# Patient Record
Sex: MALE | Race: WHITE | NOT HISPANIC OR LATINO | Employment: PART TIME | URBAN - METROPOLITAN AREA
[De-identification: names, ages, dates, MRNs, and addresses within clinical notes are randomized per-mention and may not be internally consistent; named-entity substitution may affect disease eponyms.]

---

## 2024-07-01 ENCOUNTER — HOSPITAL ENCOUNTER (EMERGENCY)
Facility: HOSPITAL | Age: 24
Discharge: HOME | End: 2024-07-01
Payer: MEDICAID

## 2024-07-01 VITALS
OXYGEN SATURATION: 98 % | RESPIRATION RATE: 16 BRPM | HEIGHT: 75 IN | HEART RATE: 60 BPM | SYSTOLIC BLOOD PRESSURE: 115 MMHG | DIASTOLIC BLOOD PRESSURE: 57 MMHG | WEIGHT: 155 LBS | TEMPERATURE: 96.8 F | BODY MASS INDEX: 19.27 KG/M2

## 2024-07-01 DIAGNOSIS — K04.7 DENTAL ABSCESS: Primary | ICD-10-CM

## 2024-07-01 PROCEDURE — 99283 EMERGENCY DEPT VISIT LOW MDM: CPT | Mod: 25

## 2024-07-01 PROCEDURE — 41800 DRAINAGE OF GUM LESION: CPT

## 2024-07-01 PROCEDURE — 40800 DRAINAGE OF MOUTH LESION: CPT | Performed by: NURSE PRACTITIONER

## 2024-07-01 RX ORDER — AMOXICILLIN AND CLAVULANATE POTASSIUM 875; 125 MG/1; MG/1
1 TABLET, FILM COATED ORAL EVERY 12 HOURS
Qty: 14 TABLET | Refills: 0 | Status: SHIPPED | OUTPATIENT
Start: 2024-07-01 | End: 2024-07-08

## 2024-07-01 RX ORDER — NAPROXEN 500 MG/1
500 TABLET ORAL 2 TIMES DAILY PRN
Qty: 20 TABLET | Refills: 0 | Status: SHIPPED | OUTPATIENT
Start: 2024-07-01

## 2024-07-01 NOTE — ED PROVIDER NOTES
HPI   Chief Complaint   Patient presents with    Dental Pain     Right upper gums       Patient is an uncomfortable appearing 24-year-old male with no past medical history on file, presents to the emergency room today for complaint dental pain.  Patient states over the past several days he has had progressively worsening pain and swelling to the right upper jaw.  Patient states has been applying heat and taking aspirin and Tylenol for discomfort however has not been helping.  Patient states he has several broken tooth in the area and is concerned may be experiencing an infection or an abscess.  Patient denies any shortness of breath difficulty breathing, sensation of airway closing, voice changes, drooling.  Patient denies any chest pain, abdominal pain, nausea, vomit, diarrhea or constipation, fever, shaking, or chills.  Patient denies being seen by dentist recently.                          Bryce Coma Scale Score: 15                     Patient History   History reviewed. No pertinent past medical history.  History reviewed. No pertinent surgical history.  No family history on file.  Social History     Tobacco Use    Smoking status: Not on file    Smokeless tobacco: Not on file   Substance Use Topics    Alcohol use: Not on file    Drug use: Not on file       Physical Exam   ED Triage Vitals [07/01/24 1735]   Temperature Heart Rate Respirations BP   36 °C (96.8 °F) 63 17 132/86      Pulse Ox Temp src Heart Rate Source Patient Position   97 % -- -- --      BP Location FiO2 (%)     -- --       Physical Exam  Vitals and nursing note reviewed.   Constitutional:       General: He is not in acute distress.     Appearance: Normal appearance. He is not ill-appearing, toxic-appearing or diaphoretic.   HENT:      Head: Normocephalic.      Nose: Nose normal.      Mouth/Throat:      Mouth: Mucous membranes are moist.      Pharynx: No oropharyngeal exudate or posterior oropharyngeal erythema.   Eyes:      Extraocular  Movements: Extraocular movements intact.      Pupils: Pupils are equal, round, and reactive to light.   Cardiovascular:      Rate and Rhythm: Normal rate and regular rhythm.      Pulses: Normal pulses.      Heart sounds: Normal heart sounds. No murmur heard.     No friction rub. No gallop.   Pulmonary:      Effort: Pulmonary effort is normal. No respiratory distress.      Breath sounds: Normal breath sounds. No stridor. No wheezing, rhonchi or rales.   Chest:      Chest wall: No tenderness.   Musculoskeletal:         General: No swelling, tenderness, deformity or signs of injury. Normal range of motion.      Cervical back: Normal range of motion and neck supple.      Right lower leg: No edema.      Left lower leg: No edema.   Skin:     General: Skin is warm.      Capillary Refill: Capillary refill takes less than 2 seconds.      Coloration: Skin is not jaundiced or pale.      Findings: No bruising, erythema, lesion or rash.   Neurological:      General: No focal deficit present.      Mental Status: He is alert and oriented to person, place, and time.         ED Course & MDM   Diagnoses as of 07/01/24 1809   Dental abscess       Medical Decision Making  Given patient's complaint presentation a thorough exam was performed.  Patient does have considerable amount of fluctuance present to the right upper gumline, several broken teeth are present with no gumline bleeding or erosion.  No stridor or wheezing auscultated over the neck, no adventitious lung sounds auscultated, speaking complete sentences no respiratory distress, cardiac sounds auscultated are regular, remains hemodynamically stable during emergency evaluation, I have a low suspicion for acute intracranial process, airway compromise, Conner's angina.  Risks and benefits were discussed with patient regards to incision and drainage of the abscess.  Patient has full mental capacity, understands materials and agreed to have incision and drainage performed.   Patient tolerated procedure well, please refer to procedure note.  Patient received prescription for Augmentin and naproxen.  Patient received list of several dental clinics in the area and strongly encouraged following up with one of the neck several days.  I encouraged monitoring symptoms, if they become worse return to emergency room for further evaluation.  Patient was agreeable with this plan discharged home in stable condition.    SULEMAN Ellsworth     Portions of this note were generated using digital voice recognition software, and may contain grammatical errors      Procedure  Incision and Drainage    Performed by: SULEMAN Ellsworth  Authorized by: SULEMAN Ellsworth    Consent:     Consent obtained:  Verbal    Consent given by:  Patient    Risks, benefits, and alternatives were discussed: yes      Risks discussed:  Bleeding, incomplete drainage, pain, infection and damage to other organs    Alternatives discussed:  No treatment, delayed treatment, alternative treatment, observation and referral  Universal protocol:     Procedure explained and questions answered to patient or proxy's satisfaction: yes      Relevant documents present and verified: yes      Test results available : yes      Imaging studies available: yes      Required blood products, implants, devices, and special equipment available: yes      Site/side marked: yes      Immediately prior to procedure, a time out was called: yes      Patient identity confirmed:  Verbally with patient and arm band  Location:     Type:  Abscess    Location:  Mouth    Mouth location:  Vestibule of mouth  Sedation:     Sedation type:  None  Anesthesia:     Anesthesia method:  Local infiltration    Local anesthetic:  Lidocaine 1% WITH epi  Procedure type:     Complexity:  Simple  Procedure details:     Ultrasound guidance: no      Needle aspiration: no      Incision types:  Stab incision    Drainage:  Bloody and purulent    Drainage amount:   Moderate    Wound treatment:  Wound left open    Packing materials:  None  Post-procedure details:     Procedure completion:  Tolerated       MUNDO Ellsworth-ANGEL  07/01/24 0267